# Patient Record
Sex: FEMALE | Race: WHITE
[De-identification: names, ages, dates, MRNs, and addresses within clinical notes are randomized per-mention and may not be internally consistent; named-entity substitution may affect disease eponyms.]

---

## 2019-10-18 ENCOUNTER — HOSPITAL ENCOUNTER (OUTPATIENT)
Dept: HOSPITAL 43 - DL.ENDO | Age: 54
Discharge: HOME | End: 2019-10-18
Attending: INTERNAL MEDICINE
Payer: MEDICAID

## 2019-10-18 DIAGNOSIS — Z90.49: ICD-10-CM

## 2019-10-18 DIAGNOSIS — K31.7: Primary | ICD-10-CM

## 2019-10-18 PROCEDURE — 81025 URINE PREGNANCY TEST: CPT

## 2019-10-18 PROCEDURE — 43239 EGD BIOPSY SINGLE/MULTIPLE: CPT

## 2019-10-18 PROCEDURE — 87077 CULTURE AEROBIC IDENTIFY: CPT

## 2019-10-18 NOTE — OR
DATE:  10/18/2019

 

PROCEDURE:  Esophagogastroduodenoscopy and multiple pinch biopsies.

 

INSTRUMENT USED:  GIF- Olympus video panendoscope.

 

PREMEDICATIONS:  No oral or topical anesthesia used.  Fentanyl 100 mcg

intravenous, Versed 2 mg intravenous, nasal O2 cannula.

 

The procedure was done under pulse oximetry, BP recording, and cardiac monitor.

 

INDICATION:  The patient with persistent high dysphagia as well as dyspepsia,

unexplained and not responsive to medical measures.  Esophagogastroduodenoscopy

is performed for detection of any active erosive lesions, Juares esophagus

and/or malignancy also under consideration, H. pylori status to be determined,

biopsies to be obtained for esophageal eosinophilia if indicated, esophageal

dilatations if indicated, endoscopic hemostasis therapy if needed.

 

PROCEDURE IN DETAIL:  The scope was passed with ease.  Adequate visualization of

the esophagus was made from proximal to distal areas.  No upper esophageal

lesions were identified.  No distal esophageal stricture.  No uphill or downhill

esophageal varices.  No Radha-Blackwell tear.  No evidence of erosive esophagitis

by [place] criteria.  No esophageal polyp or tumor mass identified.  Z-line

was noted at around 39 cm distal to the oral verge, configuration consistent

with grade 1 by ZAP classification.  No proximal gastric varices noted.  Gastric

fundus examination by retroflexion showed benign appearing diminutive polyps.

No gastric ulcer, malignant mass, or vascular ectasia identified.  Duodenal bulb

showed no ulcer.  Visualized second part of the duodenum is unremarkable.

Multiple pinch biopsies were taken from the gastric antrum and proximal body and

sent for PyloriTek test for H. pylori and histopathology.  Four-quadrant

biopsies were taken from the distal and proximal esophagus and sent for any

evidence of esophageal eosinophilia.  No bleeding was noted from any of the

visualized areas at the completion of examination.  Photographs were taken of

the duodenal bulb, gastric antrum, fundus, and distal esophagus.

 

IMPRESSION:  Diminutive gastric fundus polyps.

 

The patient tolerated the procedure well.

 

DD:  10/18/2019 07:47:49

DT:  10/18/2019 10:48:09

[facility]

Job #:  [id number]

## 2021-06-16 ENCOUNTER — HOSPITAL ENCOUNTER (EMERGENCY)
Dept: HOSPITAL 43 - DL.ED | Age: 56
LOS: 1 days | Discharge: NURSING, REHAB OR LONG-TERM CARE | End: 2021-06-17
Payer: MEDICAID

## 2021-06-16 DIAGNOSIS — I21.4: Primary | ICD-10-CM

## 2021-06-16 DIAGNOSIS — Z20.822: ICD-10-CM

## 2021-06-16 LAB
ANION GAP SERPL CALC-SCNC: 12 MEQ/L (ref 7–13)
CHLORIDE SERPL-SCNC: 106 MMOL/L (ref 98–107)
SODIUM SERPL-SCNC: 142 MMOL/L (ref 136–145)

## 2021-06-16 PROCEDURE — U0002 COVID-19 LAB TEST NON-CDC: HCPCS

## 2021-06-16 NOTE — CR
PROCEDURE INFORMATION: 

Exam: XR Chest 

Exam date and time: 6/16/2021 6:12 PM 

Age: 56 years old 

Clinical indication: Other: Near syncope 



TECHNIQUE: 

Imaging protocol: XR of the chest. 

Views: 1 view. 



COMPARISON: 

No relevant prior studies available. 



FINDINGS: 

Lungs: No suspicious pulmonary nodules or areas of lung consolidation. 

Pleural spaces: Unremarkable. No pleural effusion. No pneumothorax. 

Heart/Mediastinum: Unremarkable. No cardiomegaly. 

Bones/joints: Age appropriate. 



IMPRESSION: 

No active disease of the chest.

## 2021-06-16 NOTE — EDM.PDOC
ED HPI GENERAL MEDICAL PROBLEM





- General


Chief Complaint: Cardiovascular Problem


Stated Complaint: ALMOST PASSED OUT, HEARTRATE FAST


Time Seen by Provider: 21 19:05


Source of Information: Reports: Patient


History Limitations: Reports: No Limitations





- History of Present Illness


INITIAL COMMENTS - FREE TEXT/NARRATIVE: 





ED with c/o feeling light headed and having palpitations while sweeping garage 

PTA, Reported going in house an dhaving to sit down in [address] for fear of 

passing out. Denied chest pain. Has had palpations in passed but did not feel 

same.  Only outside in heat approximately 10 minutes. No nausea or vomiting. No 

drugs or alcohol. no weakness. 





- Related Data


                                    Allergies











Allergy/AdvReac Type Severity Reaction Status Date / Time


 


No Known Allergies Allergy   Verified 21 18:00











Home Meds: 


                                    Home Meds





Ibuprofen [Motrin] 600 mg PO .PRN PRN 14 [History]


Ascorbic Acid 500 mg PO DAILY 10/17/19 [History]


Cholecalciferol (Vitamin D3) [Vitamin D3] 400 units PO DAILY 10/17/19 [History]


Cyanocobalamin (Vitamin B12) [Vitamin B12] 500 mcg PO DAILY 10/17/19 [History]


Flaxseed Oil [Flaxseed] 1,000 mg PO DAILY 10/17/19 [History]


Iron,Carbonyl [Iron Chews] 45 mg PO DAILY 10/17/19 [History]


Loratadine 10 mg PO DAILY 10/17/19 [History]


Magnesium 250 mg PO DAILY 10/17/19 [History]


Multivitamin [Poly-Vitamin] 1 tab PO DAILY 10/17/19 [History]


Tranexamic Acid [Lysteda] 1,300 mg PO TID 10/17/19 [History]


Vitamin A 10,000 unit PO DAILY 10/17/19 [History]


Vitamin E 400 units PO DAILY 10/17/19 [History]


Amitriptyline [Elavil] 25 mg PO BEDTIME 21 [History]











Past Medical History


HEENT History: Reports: None


Cardiovascular History: Reports: Arrhythmia


Respiratory History: Reports: None


Gastrointestinal History: Reports: None


Genitourinary History: Reports: None


OB/GYN History: Reports: Pregnancy


Musculoskeletal History: Reports: None


Neurological History: Reports: None


Psychiatric History: Reports: Anxiety, Panic Attack


Endocrine/Metabolic History: Reports: None


Hematologic History: Reports: Anemia


Oncologic (Cancer) History: Reports: None


Dermatologic History: Reports: None





- Infectious Disease History


Infectious Disease History: Reports: Chicken Pox, Measles





- Past Surgical History


HEENT Surgical History: Reports: None


Cardiovascular Surgical History: Reports: None


Respiratory Surgical History: Reports: None


GI Surgical History: Reports: Appendectomy


Female  Surgical History: Reports: Breast Biopsy,  Section


Musculoskeletal Surgical History: Reports: None





Social & Family History





- Family History


Family Medical History: No Pertinent Family History





- Tobacco Use


Tobacco Use Status *Q: Never Tobacco User


Second Hand Smoke Exposure: No





- Caffeine Use


Caffeine Use: Reports: None





- Recreational Drug Use


Recreational Drug Use: No





ED ROS GENERAL





- Review of Systems


Review Of Systems: Comprehensive ROS is negative, except as noted in HPI.





ED EXAM, GENERAL





- Physical Exam


Exam: See Below


Exam Limited By: No Limitations


General Appearance: Alert, Anxious


Ears: Normal External Exam, Hearing Grossly Normal


Nose: Normal Inspection, Normal Mucosa


Throat/Mouth: Normal Inspection, Normal Lips


Head: Atraumatic, Normocephalic


Neck: Normal Inspection


Respiratory/Chest: No Respiratory Distress, Lungs Clear, Normal Breath Sounds


Cardiovascular: Normal Peripheral Pulses, Regular Rate, Rhythm, No Edema


GI/Abdominal: Normal Bowel Sounds, Soft, Non-Tender


Back Exam: Normal Inspection


Extremities: Normal Inspection


Neurological: Alert, Oriented, Normal Cognition


Psychiatric: Anxious


Skin Exam: Warm, Dry, Intact, Normal Color


  ** #1 Interpretation


EKG Date: 21


Time: 17:56


Rhythm: Other (sinus tach)


Rate (Beats/Min): 104


Axis: Normal


P-Wave: Present


QRS: Normal


ST-T: Normal


Comparison: NA - No Prior EKG





  ** #2 Interpretation


EKG Date: 21


Time: 20:33


Rhythm: NSR


Rate (Beats/Min): 83


Axis: Normal


P-Wave: Present


QRS: Normal


Comparison: No Change





Course





- Vital Signs


Last Recorded V/S: 


                                Last Vital Signs











Temp  98 F   21 18:01


 


Pulse  119 H  21 18:01


 


Resp  20   21 18:01


 


BP  134/86   21 18:01


 


Pulse Ox  97   21 18:01














- Orders/Labs/Meds


Labs: 


                                Laboratory Tests











  21 Range/Units





  18:10 18:10 18:10 


 


WBC  4.8 L    (5.0-10.0)  10^3/uL


 


RBC  5.05    (4.2-5.4)  10^6/uL


 


Hgb  14.8    (12.0-16.0)  g/dL


 


Hct  42.7    (37.0-47.0)  %


 


MCV  84.6    ()  fL


 


MCH  29.3    (27.0-34.0)  pg


 


MCHC  34.7    (33.0-35.0)  g/dL


 


Plt Count  187    (150-450)  10^3/uL


 


Neut % (Auto)  63.0    (42.2-75.2)  %


 


Lymph % (Auto)  20.0 L    (20.5-50.1)  %


 


Mono % (Auto)  12.2 H    (2-8)  %


 


Eos % (Auto)  4.4 H    (1.0-3.0)  %


 


Baso % (Auto)  0.4    (0.0-1.0)  %


 


D-Dimer, Quantitative     (0-400)  ng/mL


 


Sodium   142   (136-145)  mmol/L


 


Potassium   4.0   (3.5-5.1)  mmol/L


 


Chloride   106   ()  mmol/L


 


Carbon Dioxide   28   (21-32)  mmol/L


 


Anion Gap   12.0   (7-13)  mEq/L


 


BUN   13   (7-18)  mg/dL


 


Creatinine   1.04 H   (0.55-1.02)  mg/dL


 


Est Cr Clr Drug Dosing   57.12   mL/min


 


Estimated GFR (MDRD)   55   


 


BUN/Creatinine Ratio   12.5   (No establ ref range)  


 


Glucose   97   (70-99)  mg/dL


 


Lactic Acid    0.5  (0.4-2.0)  mmol/L


 


Calcium   8.8   (8.5-10.1)  mg/dL


 


Total Bilirubin   0.6   (0.2-1.0)  mg/dL


 


AST   24   (15-37)  U/L


 


ALT   34   (14-59)  U/L


 


Alkaline Phosphatase   83   ()  U/L


 


Troponin I High Sens   56 H*   (<=51)  pg/mL


 


Total Protein   7.2   (6.4-8.2)  g/dL


 


Albumin   3.9   (3.4-5.0)  g/dL


 


Globulin   3.3   


 


Albumin/Globulin Ratio   1.2   


 


TSH, Ultra Sensitive     (0.36-3.74)  uIU/mL


 


Urine Color     (YELLOW)  


 


Urine Appearance     (CLEAR)  


 


Urine pH     (5.0-9.0)  


 


Ur Specific Gravity     (1.005-1.030)  


 


Urine Protein     (NEGATIVE)  


 


Urine Glucose (UA)     (NEGATIVE)  


 


Urine Ketones     (NEGATIVE)  


 


Urine Occult Blood     (NEGATIVE)  


 


Urine Nitrite     (NEGATIVE)  


 


Urine Bilirubin     (NEGATIVE)  


 


Urine Urobilinogen     (0.2-1.0)  mg/dL


 


Ur Leukocyte Esterase     (NEGATIVE)  


 


Urine RBC     /HPF


 


Urine WBC     (0-5/HPF)  /HPF


 


Ur Epithelial Cells     (NOT SEEN)  /HPF


 


Amorphous Sediment     (NOT SEEN)  /HPF


 


Urine Bacteria     (0-FEW/HPF)  /HPF


 


Urine Mucus     (NOT SEEN)  /LPF


 


SARS-CoV-2 RNA (FAITH)     (NEGATIVE)  














  21 Range/Units





  18:10 18:10 20:05 


 


WBC     (5.0-10.0)  10^3/uL


 


RBC     (4.2-5.4)  10^6/uL


 


Hgb     (12.0-16.0)  g/dL


 


Hct     (37.0-47.0)  %


 


MCV     ()  fL


 


MCH     (27.0-34.0)  pg


 


MCHC     (33.0-35.0)  g/dL


 


Plt Count     (150-450)  10^3/uL


 


Neut % (Auto)     (42.2-75.2)  %


 


Lymph % (Auto)     (20.5-50.1)  %


 


Mono % (Auto)     (2-8)  %


 


Eos % (Auto)     (1.0-3.0)  %


 


Baso % (Auto)     (0.0-1.0)  %


 


D-Dimer, Quantitative   < 100   (0-400)  ng/mL


 


Sodium     (136-145)  mmol/L


 


Potassium     (3.5-5.1)  mmol/L


 


Chloride     ()  mmol/L


 


Carbon Dioxide     (21-32)  mmol/L


 


Anion Gap     (7-13)  mEq/L


 


BUN     (7-18)  mg/dL


 


Creatinine     (0.55-1.02)  mg/dL


 


Est Cr Clr Drug Dosing     mL/min


 


Estimated GFR (MDRD)     


 


BUN/Creatinine Ratio     (No establ ref range)  


 


Glucose     (70-99)  mg/dL


 


Lactic Acid     (0.4-2.0)  mmol/L


 


Calcium     (8.5-10.1)  mg/dL


 


Total Bilirubin     (0.2-1.0)  mg/dL


 


AST     (15-37)  U/L


 


ALT     (14-59)  U/L


 


Alkaline Phosphatase     ()  U/L


 


Troponin I High Sens    210 H*  (<=51)  pg/mL


 


Total Protein     (6.4-8.2)  g/dL


 


Albumin     (3.4-5.0)  g/dL


 


Globulin     


 


Albumin/Globulin Ratio     


 


TSH, Ultra Sensitive  3.98 H    (0.36-3.74)  uIU/mL


 


Urine Color     (YELLOW)  


 


Urine Appearance     (CLEAR)  


 


Urine pH     (5.0-9.0)  


 


Ur Specific Gravity     (1.005-1.030)  


 


Urine Protein     (NEGATIVE)  


 


Urine Glucose (UA)     (NEGATIVE)  


 


Urine Ketones     (NEGATIVE)  


 


Urine Occult Blood     (NEGATIVE)  


 


Urine Nitrite     (NEGATIVE)  


 


Urine Bilirubin     (NEGATIVE)  


 


Urine Urobilinogen     (0.2-1.0)  mg/dL


 


Ur Leukocyte Esterase     (NEGATIVE)  


 


Urine RBC     /HPF


 


Urine WBC     (0-5/HPF)  /HPF


 


Ur Epithelial Cells     (NOT SEEN)  /HPF


 


Amorphous Sediment     (NOT SEEN)  /HPF


 


Urine Bacteria     (0-FEW/HPF)  /HPF


 


Urine Mucus     (NOT SEEN)  /LPF


 


SARS-CoV-2 RNA (FAITH)     (NEGATIVE)  














  21 Range/Units





  22:07 22:38 


 


WBC    (5.0-10.0)  10^3/uL


 


RBC    (4.2-5.4)  10^6/uL


 


Hgb    (12.0-16.0)  g/dL


 


Hct    (37.0-47.0)  %


 


MCV    ()  fL


 


MCH    (27.0-34.0)  pg


 


MCHC    (33.0-35.0)  g/dL


 


Plt Count    (150-450)  10^3/uL


 


Neut % (Auto)    (42.2-75.2)  %


 


Lymph % (Auto)    (20.5-50.1)  %


 


Mono % (Auto)    (2-8)  %


 


Eos % (Auto)    (1.0-3.0)  %


 


Baso % (Auto)    (0.0-1.0)  %


 


D-Dimer, Quantitative    (0-400)  ng/mL


 


Sodium    (136-145)  mmol/L


 


Potassium    (3.5-5.1)  mmol/L


 


Chloride    ()  mmol/L


 


Carbon Dioxide    (21-32)  mmol/L


 


Anion Gap    (7-13)  mEq/L


 


BUN    (7-18)  mg/dL


 


Creatinine    (0.55-1.02)  mg/dL


 


Est Cr Clr Drug Dosing    mL/min


 


Estimated GFR (MDRD)    


 


BUN/Creatinine Ratio    (No establ ref range)  


 


Glucose    (70-99)  mg/dL


 


Lactic Acid    (0.4-2.0)  mmol/L


 


Calcium    (8.5-10.1)  mg/dL


 


Total Bilirubin    (0.2-1.0)  mg/dL


 


AST    (15-37)  U/L


 


ALT    (14-59)  U/L


 


Alkaline Phosphatase    ()  U/L


 


Troponin I High Sens    (<=51)  pg/mL


 


Total Protein    (6.4-8.2)  g/dL


 


Albumin    (3.4-5.0)  g/dL


 


Globulin    


 


Albumin/Globulin Ratio    


 


TSH, Ultra Sensitive    (0.36-3.74)  uIU/mL


 


Urine Color  Yellow   (YELLOW)  


 


Urine Appearance  Slightly cloudy   (CLEAR)  


 


Urine pH  7.0   (5.0-9.0)  


 


Ur Specific Gravity  1.020   (1.005-1.030)  


 


Urine Protein  Negative   (NEGATIVE)  


 


Urine Glucose (UA)  Negative   (NEGATIVE)  


 


Urine Ketones  Negative   (NEGATIVE)  


 


Urine Occult Blood  Trace-intact H   (NEGATIVE)  


 


Urine Nitrite  Negative   (NEGATIVE)  


 


Urine Bilirubin  Negative   (NEGATIVE)  


 


Urine Urobilinogen  0.2   (0.2-1.0)  mg/dL


 


Ur Leukocyte Esterase  Small H   (NEGATIVE)  


 


Urine RBC  5-10 H   /HPF


 


Urine WBC  20-30 H   (0-5/HPF)  /HPF


 


Ur Epithelial Cells  Few   (NOT SEEN)  /HPF


 


Amorphous Sediment  Rare   (NOT SEEN)  /HPF


 


Urine Bacteria  Few   (0-FEW/HPF)  /HPF


 


Urine Mucus  Rare   (NOT SEEN)  /LPF


 


SARS-CoV-2 RNA (FAITH)   Negative  (NEGATIVE)  











Meds: 


Medications














Discontinued Medications














Generic Name Dose Route Start Last Admin





  Trade Name Freq  PRN Reason Stop Dose Admin


 


Aspirin  324 mg  21 20:34  21 21:07





  Aspirin 81 Mg Tab.Chew  PO  21 20:35  324 mg





  ONETIME ONE   Administration


 


Heparin Sodium (Porcine)  3,500 units  21 22:25  21 22:56





  Heparin Sodium 5,000 Units/Ml Vial  IVPUSH  21 22:26  3,500 units





  .BOLUS ONE   Administration


 


Sodium Chloride  1,000 mls @ 500 mls/hr  21 19:39  21 20:11





  Normal Saline  IV  21 21:38  500 mls/hr





  .BOLUS ONE   Administration


 


Heparin Sodium/Sodium Chloride  25,000 units in 500 mls @ 14.043 mls/hr  

21 22:26  21 22:55





  Heparin 25,000 Units In 1/2 Ns 500 Ml  IV  21 10:02  12 units/kg/hr





  TITRATE ONE   14.043 mls/hr





    Administration





  Protocol  





  12 UNITS/KG/HR  


 


Lorazepam  0.05 mg  21 22:34  21 23:15





  Lorazepam 2 Mg/Ml Sdv  IVPUSH  21 22:35  Not Given





  ONETIME ONE  


 


Lorazepam  0.5 mg  21 23:01  21 23:01





  Lorazepam 2 Mg/Ml Sdv  IVPUSH  21 23:02  0.5 mg





  ONETIME ONE   Administration














- Re-Assessments/Exams


Free Text/Narrative Re-Assessment/Exam: 








[facility] no bed available. [facility] accepting





Departure





- Departure


Time of Disposition: 00:05


Disposition: [facility] to [facility]


Reason for Transfer *Q: Other


Condition: Undetermined


Clinical Impression: 


 NSTEMI (non-ST elevated myocardial infarction)





Referrals: 


PCP,None [Primary Care Provider] - 


Forms:  ED Department Discharge





Sepsis Event Note (ED)





- Evaluation


Sepsis Screening Result: No Definite Risk